# Patient Record
Sex: MALE | Race: WHITE | URBAN - METROPOLITAN AREA
[De-identification: names, ages, dates, MRNs, and addresses within clinical notes are randomized per-mention and may not be internally consistent; named-entity substitution may affect disease eponyms.]

---

## 2019-02-27 ENCOUNTER — OFFICE VISIT (OUTPATIENT)
Dept: FAMILY MEDICINE CLINIC | Age: 24
End: 2019-02-27
Payer: COMMERCIAL

## 2019-02-27 VITALS
SYSTOLIC BLOOD PRESSURE: 118 MMHG | HEART RATE: 80 BPM | TEMPERATURE: 98.5 F | RESPIRATION RATE: 12 BRPM | BODY MASS INDEX: 24.78 KG/M2 | WEIGHT: 187 LBS | DIASTOLIC BLOOD PRESSURE: 70 MMHG | HEIGHT: 73 IN

## 2019-02-27 DIAGNOSIS — S39.012A STRAIN OF LUMBAR REGION, INITIAL ENCOUNTER: ICD-10-CM

## 2019-02-27 DIAGNOSIS — K52.9 ENTEROCOLITIS: Primary | ICD-10-CM

## 2019-02-27 PROCEDURE — G8484 FLU IMMUNIZE NO ADMIN: HCPCS | Performed by: FAMILY MEDICINE

## 2019-02-27 PROCEDURE — 99213 OFFICE O/P EST LOW 20 MIN: CPT | Performed by: FAMILY MEDICINE

## 2019-02-27 PROCEDURE — G8420 CALC BMI NORM PARAMETERS: HCPCS | Performed by: FAMILY MEDICINE

## 2019-02-27 PROCEDURE — 1036F TOBACCO NON-USER: CPT | Performed by: FAMILY MEDICINE

## 2019-02-27 PROCEDURE — G8427 DOCREV CUR MEDS BY ELIG CLIN: HCPCS | Performed by: FAMILY MEDICINE

## 2019-02-27 ASSESSMENT — PATIENT HEALTH QUESTIONNAIRE - PHQ9
2. FEELING DOWN, DEPRESSED OR HOPELESS: 0
SUM OF ALL RESPONSES TO PHQ9 QUESTIONS 1 & 2: 0
SUM OF ALL RESPONSES TO PHQ QUESTIONS 1-9: 0
SUM OF ALL RESPONSES TO PHQ QUESTIONS 1-9: 0
1. LITTLE INTEREST OR PLEASURE IN DOING THINGS: 0

## 2019-03-05 ENCOUNTER — OFFICE VISIT (OUTPATIENT)
Dept: FAMILY MEDICINE CLINIC | Age: 24
End: 2019-03-05
Payer: COMMERCIAL

## 2019-03-05 VITALS
SYSTOLIC BLOOD PRESSURE: 136 MMHG | WEIGHT: 193 LBS | DIASTOLIC BLOOD PRESSURE: 84 MMHG | BODY MASS INDEX: 25.46 KG/M2 | TEMPERATURE: 98.5 F | HEART RATE: 72 BPM | RESPIRATION RATE: 14 BRPM

## 2019-03-05 DIAGNOSIS — M54.9 CVA TENDERNESS: ICD-10-CM

## 2019-03-05 DIAGNOSIS — R19.7 DIARRHEA OF PRESUMED INFECTIOUS ORIGIN: ICD-10-CM

## 2019-03-05 DIAGNOSIS — R35.0 URINARY FREQUENCY: ICD-10-CM

## 2019-03-05 DIAGNOSIS — S39.012D STRAIN OF LUMBAR REGION, SUBSEQUENT ENCOUNTER: ICD-10-CM

## 2019-03-05 DIAGNOSIS — R19.7 DIARRHEA OF PRESUMED INFECTIOUS ORIGIN: Primary | ICD-10-CM

## 2019-03-05 PROBLEM — S39.012A STRAIN OF LUMBAR REGION: Status: ACTIVE | Noted: 2019-03-05

## 2019-03-05 PROBLEM — R10.9 FLANK PAIN: Status: ACTIVE | Noted: 2019-03-05

## 2019-03-05 LAB
APPEARANCE FLUID: ABNORMAL
BILIRUBIN, POC: ABNORMAL
BLOOD URINE, POC: ABNORMAL
CLARITY, POC: ABNORMAL
COLOR, POC: ABNORMAL
GLUCOSE URINE, POC: ABNORMAL
KETONES, POC: ABNORMAL
LEUKOCYTE EST, POC: ABNORMAL
NITRITE, POC: ABNORMAL
PH, POC: ABNORMAL
PROTEIN, POC: ABNORMAL
SPECIFIC GRAVITY, POC: 1.02
UROBILINOGEN, POC: ABNORMAL

## 2019-03-05 PROCEDURE — 99213 OFFICE O/P EST LOW 20 MIN: CPT | Performed by: NURSE PRACTITIONER

## 2019-03-05 PROCEDURE — G8427 DOCREV CUR MEDS BY ELIG CLIN: HCPCS | Performed by: NURSE PRACTITIONER

## 2019-03-05 PROCEDURE — G8419 CALC BMI OUT NRM PARAM NOF/U: HCPCS | Performed by: NURSE PRACTITIONER

## 2019-03-05 PROCEDURE — 1036F TOBACCO NON-USER: CPT | Performed by: NURSE PRACTITIONER

## 2019-03-05 PROCEDURE — G8484 FLU IMMUNIZE NO ADMIN: HCPCS | Performed by: NURSE PRACTITIONER

## 2019-03-05 PROCEDURE — 81002 URINALYSIS NONAUTO W/O SCOPE: CPT | Performed by: NURSE PRACTITIONER

## 2019-03-05 RX ORDER — CYCLOBENZAPRINE HCL 10 MG
10 TABLET ORAL 3 TIMES DAILY PRN
Qty: 20 TABLET | Refills: 0 | Status: SHIPPED | OUTPATIENT
Start: 2019-03-05 | End: 2019-03-15

## 2019-03-05 ASSESSMENT — ENCOUNTER SYMPTOMS
EYE PAIN: 0
NAUSEA: 1
SINUS PRESSURE: 0
BACK PAIN: 1
EYE ITCHING: 0
WHEEZING: 0
EYE REDNESS: 0
EYE DISCHARGE: 0
TROUBLE SWALLOWING: 0
SORE THROAT: 0
CONSTIPATION: 0
VOMITING: 0
SINUS PAIN: 0
DIARRHEA: 1
COUGH: 0
RHINORRHEA: 0
CHEST TIGHTNESS: 0
SHORTNESS OF BREATH: 0

## 2019-03-06 LAB
C DIFFICILE TOXIN, EIA: NORMAL
CRYPTOSPORIDIUM ANTIGEN STOOL: NORMAL
GI BACTERIAL PATHOGENS BY PCR: NORMAL
GIARDIA ANTIGEN STOOL: NORMAL

## 2019-03-07 LAB — URINE CULTURE, ROUTINE: NORMAL

## 2019-03-08 LAB — H PYLORI ANTIGEN STOOL: NEGATIVE

## 2020-08-11 ENCOUNTER — VIRTUAL VISIT (OUTPATIENT)
Dept: GASTROENTEROLOGY | Age: 25
End: 2020-08-11
Payer: COMMERCIAL

## 2020-08-11 PROCEDURE — 99203 OFFICE O/P NEW LOW 30 MIN: CPT | Performed by: INTERNAL MEDICINE

## 2020-08-11 RX ORDER — POLYETHYLENE GLYCOL 3350, SODIUM CHLORIDE, SODIUM BICARBONATE, POTASSIUM CHLORIDE 420; 11.2; 5.72; 1.48 G/4L; G/4L; G/4L; G/4L
4000 POWDER, FOR SOLUTION ORAL ONCE
Qty: 1 BOTTLE | Refills: 0 | Status: SHIPPED | OUTPATIENT
Start: 2020-08-11 | End: 2020-08-11

## 2020-08-11 RX ORDER — ESCITALOPRAM OXALATE 20 MG/1
TABLET ORAL
COMMUNITY
Start: 2020-07-11

## 2020-08-11 RX ORDER — AMOXICILLIN AND CLAVULANATE POTASSIUM 875; 125 MG/1; MG/1
TABLET, FILM COATED ORAL
COMMUNITY
Start: 2020-08-09

## 2020-08-11 NOTE — PROGRESS NOTES
2020    TELEHEALTH EVALUATION -- Audio/Visual (During RPXGA-47 public health emergency)    Due to COVID 19 outbreak, patient's office visit was converted to a virtual visit. Patient was contacted and agreed to proceed with a virtual visit via [email or web address]  The risks and benefits of converting to a virtual visit were discussed in light of the current infectious disease epidemic. Patient also understood that insurance coverage and co-pays are up to their individual insurance plans. Chief Complaint   Patient presents with    New Patient     Patient complaining of constant diarrhea and pain x2 years intermittently. Has gotten worse in the past few months. Denies seeing blood in his stool. Up to 5 BM's daily.  Diarrhea     HPI:  Kandice Goodwin (:  1995) has requested an audio/video evaluation for the following concern(s): This is very pleasant 51-year-old who whom we are consulted for diarrhea. Patient report watery stool diarrhea for 2 years worsening over the last few weeks. Patient mentioned that diarrhea and watery stool is significant compromise daily life activities been associated with abdominal pain. Abdominal pain not necessarily get relieved with bowel movements. Patient denies any blood in the stool. No triggering factors for diarrhea though he mentioned that dairy product may make the diarrhea worse. No associated fever or chills. No relieving factors. No alternating constipation. No weight loss reported. No nausea or vomiting. Patient mentioned that the pain is below the umbilicus he tried previous medication but does not recall the name with no improvement. Given the diarrhea that worsened over the last few weeks associated abdominal pain patient referred to us for further evaluation. Previous GI work up/Endoscopic investigations:     Review of Systems    Prior to Visit Medications    Medication Sig Taking?  Authorizing Provider   escitalopram (LEXAPRO) 20 MG tablet TAKE 1 TABLET DAILY Yes Historical Provider, MD   amoxicillin-clavulanate (AUGMENTIN) 875-125 MG per tablet TAKE 1 TABLET BY MOUTH EVERY 12 HOURS FOR 10 DAYS Yes Historical Provider, MD   polyethylene glycol-electrolytes (NULYTELY) 420 g solution Take 4,000 mLs by mouth once for 1 dose Yes Maggy Mueller MD     Social History     Tobacco Use    Smoking status: Never Smoker    Smokeless tobacco: Never Used   Substance Use Topics    Alcohol use: No     Comment: occassionally     Drug use: No      No Known Allergies,   Past Medical History:   Diagnosis Date    Asthma     Myopia     Pharyngitis     Scoliosis     MILD    Upper respiratory infection    ,   Past Surgical History:   Procedure Laterality Date    HERNIA REPAIR  2003   ,   Social History     Tobacco Use    Smoking status: Never Smoker    Smokeless tobacco: Never Used   Substance Use Topics    Alcohol use: No     Comment: occassionally     Drug use: No   ,   Family History   Problem Relation Age of Onset    Cancer Other     Diabetes Other     Heart Disease Other     Asthma Other     Allergies Other     Heart Disease Other     High Blood Pressure Other     Asthma Other     Allergies Other    ,   Immunization History   Administered Date(s) Administered    DTaP 1995, 1995, 1995, 09/04/1996, 03/17/2000    Hepatitis B 1995, 1995, 03/04/1996, 05/31/1996    MMR 05/31/1996, 03/17/2000    Polio IPV (IPOL) 1995, 1995, 09/04/1996, 03/17/2000    Varicella (Varivax) 12/19/1997   ,   Health Maintenance   Topic Date Due    Varicella vaccine (2 of 2 - 2-dose childhood series) 03/03/1999    Pneumococcal 0-64 years Vaccine (1 of 1 - PPSV23) 03/03/2001    HPV vaccine (1 - Male 2-dose series) 03/03/2006    DTaP/Tdap/Td vaccine (6 - Tdap) 03/03/2006    Flu vaccine (1) 09/01/2020    Hepatitis B vaccine  Completed    HIV screen  Addressed    Hepatitis A vaccine  Aged Out    Hib vaccine  Aged C/ Ti Rena 19 Meningococcal (ACWY) vaccine  Aged Out       PHYSICAL EXAMINATION:  [ INSTRUCTIONS:  \"[x]\" Indicates a positive item  \"[]\" Indicates a negative item  -- DELETE ALL ITEMS NOT EXAMINED]  [x] Alert  [x] Oriented to person/place/time    [x] No apparent distress  [] Toxic appearing    [] Face flushed appearing [] Sclera clear  [] Lips are cyanotic      [] Breathing appears normal  [] Appears tachypneic      [] Rash on visible skin    [] Cranial Nerves II-XII grossly intact    [] Motor grossly intact in visible upper extremities    [] Motor grossly intact in visible lower extremities    [] Normal Mood  [] Anxious appearing    [] Depressed appearing  [] Confused appearing      [] Poor short term memory  [] Poor long term memory    [] OTHER:      Due to this being a TeleHealth encounter, evaluation of the following organ systems is limited: Vitals/Constitutional/EENT/Resp/CV/GI//MS/Neuro/Skin/Heme-Lymph-Imm. ASSESSMENT/PLAN:    1-Diarrhea, abdominal pain  We will proceed with initial work-up including stool studies and stool calprotectin. Update blood work obtain CBC and CMP. Obtain celiac serologies. Diary to assess for any correlation with certain type of food  Will proceed with colonoscopy to assess for any mucosal etiologies assess for any inflammatory bowel disease. Colonoscopy with terminal ileum intubation  The patient was counseled at length about the risks of loren Covid-19 during their perioperative period and any recovery window from their procedure. The patient was made aware that loren Covid-19  may worsen their prognosis for recovering from their procedure  and lend to a higher morbidity and/or mortality risk. All material risks, benefits, and reasonable alternatives including postponing the procedure were discussed. The patient does wish to proceed with the procedure at this time.   2- No significant associated medical conditions    Return in about 4 weeks (around 9/8/2020) for Post procedure results discussion, further management. An electronic signature was used to authenticate this note. --Tj Warren MD on 8/11/2020 at 3:42 PM  [facility]  [facility]      Purs2-no significant associated medical conditionsuant to the emergency declaration under the [facility] waiver authority and the Coronavirus Preparedness and [employer] Act, this Virtual Visit was conducted, with patient's consent, to reduce the patient's risk of exposure to COVID-19 and provide continuity of care for an established patient. Services were provided through a video synchronous discussion virtually to substitute for in-person clinic visit. Please note this report has been partially produced using speech recognition software and may cause contain errors related to thatsystem including grammar, punctuation and spelling as well as words and phrases that may seem inappropriate. If there are questions or concerns please feel free to contact me to clarify.

## 2020-08-12 ENCOUNTER — TELEPHONE (OUTPATIENT)
Dept: GASTROENTEROLOGY | Age: 25
End: 2020-08-12

## 2020-08-12 NOTE — TELEPHONE ENCOUNTER
Pt called and states he would like to get an EGD added to his colon on 08/24/2020 due to frequent heartburn/GERD.

## 2020-08-24 ENCOUNTER — OUTSIDE SERVICES (OUTPATIENT)
Dept: GASTROENTEROLOGY | Age: 25
End: 2020-08-24
Payer: COMMERCIAL

## 2020-08-24 PROCEDURE — 43239 EGD BIOPSY SINGLE/MULTIPLE: CPT | Performed by: INTERNAL MEDICINE

## 2020-08-24 PROCEDURE — 45378 DIAGNOSTIC COLONOSCOPY: CPT | Performed by: INTERNAL MEDICINE

## 2020-08-24 NOTE — OP NOTE
Endoscopy Note    Patient: Soraya More  : 1995     Procedure: Esophagogastroduodenoscopy with biopsy                       Colonoscopy     Date:  2020     Endoscopist:   Warren Lew MD    Referring Physician:  Catracho Fried MD    Preoperative diagnosis: Abdominal pain,  diarrhea  Postoperative Diagnosis: Negative EGD and colonoscopy    Anesthesia: MAC    Consent:  The patient or their legal guardian has signed a consent, and is aware of the potential risks, benefits, alternatives, and potential complications of this procedure. These include, but are not limited to hemorrhage, bleeding, post procedural pain, perforation, phlebitis, aspiration, hypotension, hypoxia, cardiovascular events such as arryhthmia, and possibly death. Additionally, the possibility of missed colonic polyps and interval colon cancer was discussed in the consent. Description of Procedure: The patient was then taken to the endoscopy suite, placed in the left lateral decubitus position and the above IV sedation was administrered. The Olympus videoendoscope was placed in the patient's mouth and under direct visualization passed into the esophagus. Visualization of the esophagus demonstrated normal  mucosa. The diaphragmatic hiatus was noted at 40 cm, top of the gastric folds were noted at 40 cm from the incisors, Gastroesophageal junction was noted at 40 cm and  squamocolumnar junction was noted at 40 cm. GE junction examined  on and off NBI     The scope was then advanced into the stomach. Visualization of the gastric body and antrum demonstrated normal .  A retroflexed exam of the gastric cardia and fundus demonstrated normal mucosa. The pylorus was patent and the scope was advanced into the duodenum. Visualization of the duodenal bulb and The second portion of the duodenum demonstrated normal mucosa  Apices obtained to assess for celiac disease.     The scope was then withdrawn back into the stomach, it was decompressed, and the scope was completely withdrawn. Patient was turned for the colonoscopy procedure. A digital rectal examination was performed and revealed negative without mass, lesions or tenderness. The Olympus video colonoscope was placed in the patient's rectum under digital direction and advanced to the cecum. The cecum was identified by characteristic anatomy and ballottment. The prep was good. The ileocecal valve was identified. The scope was then withdrawn back through the cecum, ascending, transverse, descending, sigmoid colon, and rectum. Careful circumferential examination of the mucosa in these areas demonstrated:    FINDINGS:  Terminal ileum: Normal Mucosa with no ulcerations  Cecum: Normal.  Ascending Colon: Normal.  Hepatic Flexure: Normal.  Transverse Colon:Normal.  Splenic Flexure: Normal.  Descending Colon: Normal.  Sigmoid Colon: Normal.  Rectum: Normal.  Retroflexed Views: Rectum did not show internal hemorrhoids. The scope was straightened, the colon was decompressed and the scope was withdrawn from the patient. The patient tolerated the procedure well and was taken to the PACU in good condition. EBL: None  Complication: None  Specimen Sent: Yes    Impression:    Negative / Normal EGD and colonoscopy  No endoscopic evidence of celiac disease with normal-appearing duodenal villi, biopsies obtained to further assess for celiac disease  No endoscopic evidence of inflammatory bowel disease.   Normal distal terminal ileum  Recommendations:   -Follow-up in GI clinic in 1 to 2 weeks virtual visit.  -Resume previous medications  -Discharge patient home when criteria met    Maria A De Paz MD  [facility]

## 2020-09-02 ENCOUNTER — VIRTUAL VISIT (OUTPATIENT)
Dept: GASTROENTEROLOGY | Age: 25
End: 2020-09-02
Payer: COMMERCIAL

## 2020-09-02 PROBLEM — R10.84 GENERALIZED ABDOMINAL PAIN: Status: ACTIVE | Noted: 2020-09-02

## 2020-09-02 PROBLEM — R19.7 DIARRHEA: Status: ACTIVE | Noted: 2020-09-02

## 2020-09-02 PROCEDURE — 1036F TOBACCO NON-USER: CPT | Performed by: NURSE PRACTITIONER

## 2020-09-02 PROCEDURE — G8421 BMI NOT CALCULATED: HCPCS | Performed by: NURSE PRACTITIONER

## 2020-09-02 PROCEDURE — G8427 DOCREV CUR MEDS BY ELIG CLIN: HCPCS | Performed by: NURSE PRACTITIONER

## 2020-09-02 PROCEDURE — 99213 OFFICE O/P EST LOW 20 MIN: CPT | Performed by: NURSE PRACTITIONER

## 2020-09-02 ASSESSMENT — ENCOUNTER SYMPTOMS
CHEST TIGHTNESS: 0
COLOR CHANGE: 0
ANAL BLEEDING: 0
BLOOD IN STOOL: 0
VOICE CHANGE: 0
PHOTOPHOBIA: 0
NAUSEA: 0
RECTAL PAIN: 0
TROUBLE SWALLOWING: 0
ABDOMINAL PAIN: 0
ABDOMINAL DISTENTION: 0
DIARRHEA: 1
EYE REDNESS: 0
VOMITING: 0
EYE PAIN: 0
CONSTIPATION: 0

## 2020-09-02 NOTE — PROGRESS NOTES
been associated with abdominal pain. Abdominal pain not necessarily get relieved with bowel movements. Patient denies any blood in the stool. No triggering factors for diarrhea though he mentioned that dairy product may make the diarrhea worse. No associated fever or chills. No relieving factors. No alternating constipation. No weight loss reported. No nausea or vomiting. Patient mentioned that the pain is below the umbilicus he tried previous medication but does not recall the name with no improvement. Given the diarrhea that worsened over the last few weeks associated abdominal pain patient referred to us for further evaluation. Review of Systems   Constitutional: Negative. Negative for chills, fatigue and fever. HENT: Negative for nosebleeds, tinnitus, trouble swallowing and voice change. Eyes: Negative for photophobia, pain and redness. Respiratory: Negative for chest tightness. Cardiovascular: Negative for chest pain, palpitations and leg swelling. Gastrointestinal: Positive for diarrhea. Negative for abdominal distention, abdominal pain, anal bleeding, blood in stool, constipation, nausea, rectal pain and vomiting. Endocrine: Negative for polydipsia, polyphagia and polyuria. Genitourinary: Negative for difficulty urinating and hematuria. Skin: Negative for color change, pallor and rash. Neurological: Negative for dizziness, speech difficulty and headaches. Psychiatric/Behavioral: Negative for confusion, sleep disturbance and suicidal ideas. Prior to Visit Medications    Medication Sig Taking?  Authorizing Provider   escitalopram (LEXAPRO) 20 MG tablet TAKE 1 TABLET DAILY Yes Historical Provider, MD   amoxicillin-clavulanate (AUGMENTIN) 875-125 MG per tablet TAKE 1 TABLET BY MOUTH EVERY 12 HOURS FOR 10 DAYS  Historical Provider, MD       Social History     Tobacco Use    Smoking status: Never Smoker    Smokeless tobacco: Never Used   Substance Use Topics    Alcohol use: show foveolar metaplasia, no previous history of HP or NSAID use  Previously ordered stool studies and blood work not completed as of yet this was reinforced with the patient  -obtain stool studies as previously ordered and add stool for HP and complete blood work as previously ordered.   -Pending stool studies will consider Bentyl, Imodium, and possible CT of the abdomen and pelvis  -Educated on low FODMAP diet  -Avoid dairy products  2. No significant associated medical conditions      Return in about 4 weeks (around 9/30/2020), or if symptoms worsen or fail to improve. An  electronic signature was used to authenticate this note. --ABIEL Mata - CNP on 9/2/2020 at 1:17 PM        Pursuant to the emergency declaration under the [facility] waiver authority and the [employer] and [employer] Act, this Virtual  Visit was conducted, with patient's consent, to reduce the patient's risk of exposure to COVID-19 and provide continuity of care for an established patient. Services were provided through a video synchronous discussion virtually to substitute for in-person clinic visit.

## 2020-09-04 DIAGNOSIS — R19.7 DIARRHEA, UNSPECIFIED TYPE: ICD-10-CM

## 2020-09-05 LAB
CRYPTOSPORIDIUM ANTIGEN STOOL: NORMAL
GI BACTERIAL PATHOGENS BY PCR: NORMAL
GIARDIA ANTIGEN STOOL: NORMAL

## 2020-09-07 LAB — H PYLORI ANTIGEN STOOL: NEGATIVE

## 2020-09-08 DIAGNOSIS — R19.7 DIARRHEA, UNSPECIFIED TYPE: ICD-10-CM

## 2020-09-08 LAB — CALPROTECTIN, FECAL: <5 UG/G

## 2020-09-11 LAB — CELIAC PANEL: 11 UNITS (ref 0–19)

## 2020-09-25 ENCOUNTER — VIRTUAL VISIT (OUTPATIENT)
Dept: GASTROENTEROLOGY | Age: 25
End: 2020-09-25
Payer: COMMERCIAL

## 2020-09-25 PROCEDURE — G8421 BMI NOT CALCULATED: HCPCS | Performed by: NURSE PRACTITIONER

## 2020-09-25 PROCEDURE — 1036F TOBACCO NON-USER: CPT | Performed by: NURSE PRACTITIONER

## 2020-09-25 PROCEDURE — 99213 OFFICE O/P EST LOW 20 MIN: CPT | Performed by: NURSE PRACTITIONER

## 2020-09-25 PROCEDURE — G8427 DOCREV CUR MEDS BY ELIG CLIN: HCPCS | Performed by: NURSE PRACTITIONER

## 2020-09-25 ASSESSMENT — ENCOUNTER SYMPTOMS
ABDOMINAL DISTENTION: 0
VOICE CHANGE: 0
EYE PAIN: 0
EYE REDNESS: 0
ANAL BLEEDING: 0
PHOTOPHOBIA: 0
RECTAL PAIN: 0
CHEST TIGHTNESS: 0
CONSTIPATION: 0
DIARRHEA: 0
TROUBLE SWALLOWING: 0
VOMITING: 0
ABDOMINAL PAIN: 0
COLOR CHANGE: 0
NAUSEA: 0
BLOOD IN STOOL: 0

## 2020-09-25 NOTE — PROGRESS NOTES
factors for diarrhea though he mentioned that dairy product may make the diarrhea worse.  No associated fever or chills.  No relieving factors.  No alternating constipation.  No weight loss reported.  No nausea or vomiting.  Patient mentioned that the pain is below the umbilicus he tried previous medication but does not recall the name with no improvement.  Given the diarrhea that worsened over the last few weeks associated abdominal pain patient referred to us for further evaluation  Review of Systems   Constitutional: Negative. Negative for chills, fatigue and fever. HENT: Negative for nosebleeds, tinnitus, trouble swallowing and voice change. Eyes: Negative for photophobia, pain and redness. Respiratory: Negative for chest tightness. Cardiovascular: Negative for chest pain, palpitations and leg swelling. Gastrointestinal: Negative for abdominal distention, abdominal pain, anal bleeding, blood in stool, constipation, diarrhea, nausea, rectal pain and vomiting. Endocrine: Negative for polydipsia, polyphagia and polyuria. Genitourinary: Negative for difficulty urinating and hematuria. Skin: Negative for color change, pallor and rash. Neurological: Negative for dizziness, speech difficulty and headaches. Psychiatric/Behavioral: Negative for confusion, sleep disturbance and suicidal ideas. Prior to Visit Medications    Medication Sig Taking?  Authorizing Provider   escitalopram (LEXAPRO) 20 MG tablet TAKE 1 TABLET DAILY Yes Historical Provider, MD   amoxicillin-clavulanate (AUGMENTIN) 875-125 MG per tablet TAKE 1 TABLET BY MOUTH EVERY 12 HOURS FOR 10 DAYS  Historical Provider, MD       Social History     Tobacco Use    Smoking status: Never Smoker    Smokeless tobacco: Never Used   Substance Use Topics    Alcohol use: No     Comment: occassionally     Drug use: No        No Known Allergies,   Past Medical History:   Diagnosis Date    Asthma     Myopia     Pharyngitis     Scoliosis MILD    Upper respiratory infection    ,   Past Surgical History:   Procedure Laterality Date    HERNIA REPAIR  2003   ,   Social History     Tobacco Use    Smoking status: Never Smoker    Smokeless tobacco: Never Used   Substance Use Topics    Alcohol use: No     Comment: occassionally     Drug use: No   ,   Family History   Problem Relation Age of Onset    Cancer Other     Diabetes Other     Heart Disease Other     Asthma Other     Allergies Other     Heart Disease Other     High Blood Pressure Other     Asthma Other     Allergies Other        PHYSICAL EXAMINATION:  [ INSTRUCTIONS:  \"[x]\" Indicates a positive item  \"[]\" Indicates a negative item  -- DELETE ALL ITEMS NOT EXAMINED]  [] Alert  [] Oriented to person/place/time    [] No apparent distress  [] Toxic appearing    [] Face flushed appearing [] Sclera clear  [] Lips are cyanotic      [] Breathing appears normal  [] Appears tachypneic      [] Rash on visible skin    [] Cranial Nerves II-XII grossly intact    [] Motor grossly intact in visible upper extremities    [] Motor grossly intact in visible lower extremities    [] Normal Mood  [] Anxious appearing    [] Depressed appearing  [] Confused appearing      [] Poor short term memory  [] Poor long term memory    [] OTHER:      Due to this being a TeleHealth encounter, evaluation of the following organ systems is limited: Vitals/Constitutional/EENT/Resp/CV/GI//MS/Neuro/Skin/Heme-Lymph-Imm. ASSESSMENT/PLAN:  1. Diarrhea and abdominal pain- resolved likely lactose intolerance  Reported 2-year history of watery diarrhea associated with abdominal cramps, does report worsening symptoms with dairy products and has since eliminated this from his diet and has complete resolution of symptoms. Noted recent EGD and colonoscopy.   Recent normal stool studies and blood work negative for celiac.  -avoid dairy products/trigger foods  - ongoing evaluation will add Bentyl and  Imodium if symptoms